# Patient Record
Sex: FEMALE | Race: WHITE | NOT HISPANIC OR LATINO | Employment: UNEMPLOYED | URBAN - METROPOLITAN AREA
[De-identification: names, ages, dates, MRNs, and addresses within clinical notes are randomized per-mention and may not be internally consistent; named-entity substitution may affect disease eponyms.]

---

## 2018-10-10 ENCOUNTER — HOSPITAL ENCOUNTER (EMERGENCY)
Facility: MEDICAL CENTER | Age: 28
End: 2018-10-11
Attending: EMERGENCY MEDICINE
Payer: MEDICAID

## 2018-10-10 ENCOUNTER — APPOINTMENT (OUTPATIENT)
Dept: RADIOLOGY | Facility: MEDICAL CENTER | Age: 28
End: 2018-10-10
Attending: EMERGENCY MEDICINE
Payer: MEDICAID

## 2018-10-10 DIAGNOSIS — M79.10 MYALGIA: ICD-10-CM

## 2018-10-10 DIAGNOSIS — R07.89 CHEST WALL PAIN: ICD-10-CM

## 2018-10-10 DIAGNOSIS — B34.9 VIRAL SYNDROME: ICD-10-CM

## 2018-10-10 LAB
ALBUMIN SERPL BCP-MCNC: 4 G/DL (ref 3.2–4.9)
ALBUMIN/GLOB SERPL: 1.2 G/DL
ALP SERPL-CCNC: 131 U/L (ref 30–99)
ALT SERPL-CCNC: 24 U/L (ref 2–50)
ANION GAP SERPL CALC-SCNC: 8 MMOL/L (ref 0–11.9)
APPEARANCE UR: CLEAR
AST SERPL-CCNC: 15 U/L (ref 12–45)
BACTERIA #/AREA URNS HPF: NEGATIVE /HPF
BASOPHILS # BLD AUTO: 0.3 % (ref 0–1.8)
BASOPHILS # BLD: 0.05 K/UL (ref 0–0.12)
BILIRUB SERPL-MCNC: 0.3 MG/DL (ref 0.1–1.5)
BILIRUB UR QL STRIP.AUTO: NEGATIVE
BNP SERPL-MCNC: 23 PG/ML (ref 0–100)
BUN SERPL-MCNC: 15 MG/DL (ref 8–22)
CALCIUM SERPL-MCNC: 9.3 MG/DL (ref 8.5–10.5)
CHLORIDE SERPL-SCNC: 98 MMOL/L (ref 96–112)
CK SERPL-CCNC: 33 U/L (ref 0–154)
CO2 SERPL-SCNC: 27 MMOL/L (ref 20–33)
COLOR UR: YELLOW
CREAT SERPL-MCNC: 0.77 MG/DL (ref 0.5–1.4)
D DIMER PPP IA.FEU-MCNC: 1.93 UG/ML (FEU) (ref 0–0.5)
EKG IMPRESSION: NORMAL
EOSINOPHIL # BLD AUTO: 0.14 K/UL (ref 0–0.51)
EOSINOPHIL NFR BLD: 0.9 % (ref 0–6.9)
EPI CELLS #/AREA URNS HPF: ABNORMAL /HPF
ERYTHROCYTE [DISTWIDTH] IN BLOOD BY AUTOMATED COUNT: 41 FL (ref 35.9–50)
FLUAV RNA SPEC QL NAA+PROBE: NEGATIVE
FLUBV RNA SPEC QL NAA+PROBE: NEGATIVE
GLOBULIN SER CALC-MCNC: 3.3 G/DL (ref 1.9–3.5)
GLUCOSE SERPL-MCNC: 88 MG/DL (ref 65–99)
GLUCOSE UR STRIP.AUTO-MCNC: NEGATIVE MG/DL
HCG SERPL QL: NEGATIVE
HCT VFR BLD AUTO: 34.3 % (ref 37–47)
HGB BLD-MCNC: 11.8 G/DL (ref 12–16)
HYALINE CASTS #/AREA URNS LPF: ABNORMAL /LPF
IMM GRANULOCYTES # BLD AUTO: 0.13 K/UL (ref 0–0.11)
IMM GRANULOCYTES NFR BLD AUTO: 0.8 % (ref 0–0.9)
KETONES UR STRIP.AUTO-MCNC: NEGATIVE MG/DL
LACTATE BLD-SCNC: 0.9 MMOL/L (ref 0.5–2)
LEUKOCYTE ESTERASE UR QL STRIP.AUTO: ABNORMAL
LYMPHOCYTES # BLD AUTO: 2.05 K/UL (ref 1–4.8)
LYMPHOCYTES NFR BLD: 13.3 % (ref 22–41)
MCH RBC QN AUTO: 30.5 PG (ref 27–33)
MCHC RBC AUTO-ENTMCNC: 34.4 G/DL (ref 33.6–35)
MCV RBC AUTO: 88.6 FL (ref 81.4–97.8)
MICRO URNS: ABNORMAL
MONOCYTES # BLD AUTO: 1.53 K/UL (ref 0–0.85)
MONOCYTES NFR BLD AUTO: 9.9 % (ref 0–13.4)
NEUTROPHILS # BLD AUTO: 11.53 K/UL (ref 2–7.15)
NEUTROPHILS NFR BLD: 74.8 % (ref 44–72)
NITRITE UR QL STRIP.AUTO: NEGATIVE
NRBC # BLD AUTO: 0 K/UL
NRBC BLD-RTO: 0 /100 WBC
PH UR STRIP.AUTO: 6 [PH]
PLATELET # BLD AUTO: 307 K/UL (ref 164–446)
PMV BLD AUTO: 9.2 FL (ref 9–12.9)
POTASSIUM SERPL-SCNC: 3.5 MMOL/L (ref 3.6–5.5)
PROT SERPL-MCNC: 7.3 G/DL (ref 6–8.2)
PROT UR QL STRIP: 30 MG/DL
RBC # BLD AUTO: 3.87 M/UL (ref 4.2–5.4)
RBC # URNS HPF: ABNORMAL /HPF
RBC UR QL AUTO: ABNORMAL
SODIUM SERPL-SCNC: 133 MMOL/L (ref 135–145)
SP GR UR STRIP.AUTO: 1.01
TROPONIN I SERPL-MCNC: <0.01 NG/ML (ref 0–0.04)
UROBILINOGEN UR STRIP.AUTO-MCNC: 1 MG/DL
WBC # BLD AUTO: 15.4 K/UL (ref 4.8–10.8)
WBC #/AREA URNS HPF: ABNORMAL /HPF

## 2018-10-10 PROCEDURE — 84703 CHORIONIC GONADOTROPIN ASSAY: CPT

## 2018-10-10 PROCEDURE — 83880 ASSAY OF NATRIURETIC PEPTIDE: CPT

## 2018-10-10 PROCEDURE — 96374 THER/PROPH/DIAG INJ IV PUSH: CPT

## 2018-10-10 PROCEDURE — 96375 TX/PRO/DX INJ NEW DRUG ADDON: CPT

## 2018-10-10 PROCEDURE — 87040 BLOOD CULTURE FOR BACTERIA: CPT

## 2018-10-10 PROCEDURE — 36415 COLL VENOUS BLD VENIPUNCTURE: CPT

## 2018-10-10 PROCEDURE — 700111 HCHG RX REV CODE 636 W/ 250 OVERRIDE (IP): Performed by: EMERGENCY MEDICINE

## 2018-10-10 PROCEDURE — 84484 ASSAY OF TROPONIN QUANT: CPT

## 2018-10-10 PROCEDURE — 87150 DNA/RNA AMPLIFIED PROBE: CPT

## 2018-10-10 PROCEDURE — 93005 ELECTROCARDIOGRAM TRACING: CPT | Performed by: EMERGENCY MEDICINE

## 2018-10-10 PROCEDURE — 85025 COMPLETE CBC W/AUTO DIFF WBC: CPT

## 2018-10-10 PROCEDURE — 85379 FIBRIN DEGRADATION QUANT: CPT

## 2018-10-10 PROCEDURE — 81001 URINALYSIS AUTO W/SCOPE: CPT

## 2018-10-10 PROCEDURE — 93005 ELECTROCARDIOGRAM TRACING: CPT

## 2018-10-10 PROCEDURE — 99285 EMERGENCY DEPT VISIT HI MDM: CPT

## 2018-10-10 PROCEDURE — 71045 X-RAY EXAM CHEST 1 VIEW: CPT

## 2018-10-10 PROCEDURE — 87502 INFLUENZA DNA AMP PROBE: CPT

## 2018-10-10 PROCEDURE — 83605 ASSAY OF LACTIC ACID: CPT

## 2018-10-10 PROCEDURE — 82550 ASSAY OF CK (CPK): CPT

## 2018-10-10 PROCEDURE — 80053 COMPREHEN METABOLIC PANEL: CPT

## 2018-10-10 PROCEDURE — 700105 HCHG RX REV CODE 258: Performed by: EMERGENCY MEDICINE

## 2018-10-10 RX ORDER — LORAZEPAM 2 MG/ML
1 INJECTION INTRAMUSCULAR ONCE
Status: COMPLETED | OUTPATIENT
Start: 2018-10-10 | End: 2018-10-10

## 2018-10-10 RX ORDER — KETOROLAC TROMETHAMINE 30 MG/ML
30 INJECTION, SOLUTION INTRAMUSCULAR; INTRAVENOUS ONCE
Status: COMPLETED | OUTPATIENT
Start: 2018-10-10 | End: 2018-10-10

## 2018-10-10 RX ORDER — SODIUM CHLORIDE 9 MG/ML
1000 INJECTION, SOLUTION INTRAVENOUS ONCE
Status: COMPLETED | OUTPATIENT
Start: 2018-10-10 | End: 2018-10-10

## 2018-10-10 RX ADMIN — SODIUM CHLORIDE 1000 ML: 9 INJECTION, SOLUTION INTRAVENOUS at 21:59

## 2018-10-10 RX ADMIN — KETOROLAC TROMETHAMINE 30 MG: 30 INJECTION, SOLUTION INTRAMUSCULAR at 21:59

## 2018-10-10 RX ADMIN — LORAZEPAM 1 MG: 2 INJECTION INTRAMUSCULAR; INTRAVENOUS at 22:32

## 2018-10-11 VITALS
HEART RATE: 100 BPM | WEIGHT: 136.91 LBS | HEIGHT: 68 IN | RESPIRATION RATE: 18 BRPM | BODY MASS INDEX: 20.75 KG/M2 | SYSTOLIC BLOOD PRESSURE: 113 MMHG | OXYGEN SATURATION: 99 % | DIASTOLIC BLOOD PRESSURE: 72 MMHG | TEMPERATURE: 98.3 F

## 2018-10-11 PROCEDURE — 87186 SC STD MICRODIL/AGAR DIL: CPT

## 2018-10-11 PROCEDURE — 71275 CT ANGIOGRAPHY CHEST: CPT

## 2018-10-11 PROCEDURE — 87086 URINE CULTURE/COLONY COUNT: CPT

## 2018-10-11 PROCEDURE — 700117 HCHG RX CONTRAST REV CODE 255: Performed by: EMERGENCY MEDICINE

## 2018-10-11 PROCEDURE — 87077 CULTURE AEROBIC IDENTIFY: CPT

## 2018-10-11 RX ADMIN — IOHEXOL 50 ML: 350 INJECTION, SOLUTION INTRAVENOUS at 00:05

## 2018-10-11 NOTE — ED TRIAGE NOTES
Chief Complaint   Patient presents with   • Chest Pain     increases with deep inspiration   • Shortness of Breath     with exertion   • Generalized Body Aches     Symptoms X 3 days.  Triage process explained to patient.  Pt back to waiting room.  Pt instructed to inform RN if any changes or questions arise.

## 2018-10-11 NOTE — ED PROVIDER NOTES
"ED Provider Note    Scribed for Benny Marc M.D. by Marlene Michael. 10/10/2018  9:38 PM    Means of arrival: walk in   History obtained from: patient   History limited by: none     CHIEF COMPLAINT  Chief Complaint   Patient presents with   • Chest Pain     increases with deep inspiration   • Shortness of Breath     with exertion   • Generalized Body Aches       HPI  Alicia Peters is a 27 y.o. female who presents to the Emergency Department for evaluation of worsening chest pain which began 3 days ago. Patient reports associated shortness of breath, chills, \"hot flashes\", fatigue, rhinorrhea, intermittent cough, nausea, abdominal pain, leg pain, frequent urination, and generalized body aches. Patient reports her chest pain is exacerbated with deep inspiration and her shortness of breath is exacerbated with exertion. She has taken Ibuprofen and Tylenol for pain relief. Patient states she currently has her menstrual period. She has a history of UTI's. No history of DVT or PE. Patient does smoke 0.5 packs of cigarettes daily and endorses to smoking marijuana daily. She is not currently taking hormones. No complaints of fevers and dysuria.     REVIEW OF SYSTEMS  Pertinent positives include chest pain, shortness of breath, chills, hot flashes, fatigue, rhinorrhea, intermittent cough, nausea yesterday, abdominal pain, leg pain, frequent urination, and generalized body aches. Pertinent negatives include no fevers and dysuria.  All other systems reviewed and negative.    PAST MEDICAL HISTORY   No pertinent history.     SURGICAL HISTORY   has a past surgical history that includes gyn surgery.    SOCIAL HISTORY  Social History   Substance Use Topics   • Smoking status: Current Every Day Smoker     Packs/day: 0.50     Types: Cigarettes   • Smokeless tobacco: Never Used   • Alcohol use No      History   Drug Use     Comment: marijuana, narcotic pain medications       FAMILY HISTORY  History reviewed. No pertinent family " "history.    CURRENT MEDICATIONS  Home Medications     Reviewed by Jony Wilcox R.N. (Registered Nurse) on 10/10/18 at 2138  Med List Status: Complete   Medication Last Dose Status        Patient Kishan Taking any Medications                       ALLERGIES  No Known Allergies    PHYSICAL EXAM  VITAL SIGNS: /72   Pulse (!) 130   Temp 36.8 °C (98.3 °F) (Temporal)   Resp 17   Ht 1.727 m (5' 8\")   Wt 62.1 kg (136 lb 14.5 oz)   SpO2 94%   BMI 20.82 kg/m²     Constitutional: Well developed, Well nourished, mild to moderate distress, Non-toxic appearance.   HENT: Normocephalic, Atraumatic, Bilateral external ears normal, Dry mucous membranes, No oral exudates.   Eyes: PERRLA, EOMI, Conjunctiva normal, No discharge.   Neck: No tenderness, Supple, No stridor.   Lymphatic: No lymphadenopathy noted.   Cardiovascular: Normal heart rate, Normal rhythm.   Thorax & Lungs: Clear to auscultation bilaterally, No respiratory distress, No wheezing, No crackles. Chest wall nontender.   Abdomen: Soft, No tenderness, No masses, No pulsatile masses.   Skin: Warm, Dry, No erythema, No rash.   Extremities:, No edema, No cyanosis.   Musculoskeletal: No tenderness to palpation or major deformities noted.  Intact distal pulses  Neurologic: Awake, alert. Moves all extremities spontaneously.  Psychiatric: Anxious.    LABS  Results for orders placed or performed during the hospital encounter of 10/10/18   CBC w/ Differential   Result Value Ref Range    WBC 15.4 (H) 4.8 - 10.8 K/uL    RBC 3.87 (L) 4.20 - 5.40 M/uL    Hemoglobin 11.8 (L) 12.0 - 16.0 g/dL    Hematocrit 34.3 (L) 37.0 - 47.0 %    MCV 88.6 81.4 - 97.8 fL    MCH 30.5 27.0 - 33.0 pg    MCHC 34.4 33.6 - 35.0 g/dL    RDW 41.0 35.9 - 50.0 fL    Platelet Count 307 164 - 446 K/uL    MPV 9.2 9.0 - 12.9 fL    Neutrophils-Polys 74.80 (H) 44.00 - 72.00 %    Lymphocytes 13.30 (L) 22.00 - 41.00 %    Monocytes 9.90 0.00 - 13.40 %    Eosinophils 0.90 0.00 - 6.90 %    Basophils 0.30 0.00 - " 1.80 %    Immature Granulocytes 0.80 0.00 - 0.90 %    Nucleated RBC 0.00 /100 WBC    Neutrophils (Absolute) 11.53 (H) 2.00 - 7.15 K/uL    Lymphs (Absolute) 2.05 1.00 - 4.80 K/uL    Monos (Absolute) 1.53 (H) 0.00 - 0.85 K/uL    Eos (Absolute) 0.14 0.00 - 0.51 K/uL    Baso (Absolute) 0.05 0.00 - 0.12 K/uL    Immature Granulocytes (abs) 0.13 (H) 0.00 - 0.11 K/uL    NRBC (Absolute) 0.00 K/uL   Complete Metabolic Panel (CMP)   Result Value Ref Range    Sodium 133 (L) 135 - 145 mmol/L    Potassium 3.5 (L) 3.6 - 5.5 mmol/L    Chloride 98 96 - 112 mmol/L    Co2 27 20 - 33 mmol/L    Anion Gap 8.0 0.0 - 11.9    Glucose 88 65 - 99 mg/dL    Bun 15 8 - 22 mg/dL    Creatinine 0.77 0.50 - 1.40 mg/dL    Calcium 9.3 8.5 - 10.5 mg/dL    AST(SGOT) 15 12 - 45 U/L    ALT(SGPT) 24 2 - 50 U/L    Alkaline Phosphatase 131 (H) 30 - 99 U/L    Total Bilirubin 0.3 0.1 - 1.5 mg/dL    Albumin 4.0 3.2 - 4.9 g/dL    Total Protein 7.3 6.0 - 8.2 g/dL    Globulin 3.3 1.9 - 3.5 g/dL    A-G Ratio 1.2 g/dL   Troponin STAT   Result Value Ref Range    Troponin I <0.01 0.00 - 0.04 ng/mL   Btype Natriuretic Peptide   Result Value Ref Range    B Natriuretic Peptide 23 0 - 100 pg/mL   Influenza By PCR, A/B   Result Value Ref Range    Influenza virus A RNA Negative Negative    Influenza virus B, PCR Negative Negative   HCG Qual Serum   Result Value Ref Range    Beta-Hcg Qualitative Serum Negative Negative   LACTIC ACID   Result Value Ref Range    Lactic Acid 0.9 0.5 - 2.0 mmol/L   URINALYSIS,CULTURE IF INDICATED   Result Value Ref Range    Color Yellow     Character Clear     Specific Gravity 1.015 <1.035    Ph 6.0 5.0 - 8.0    Glucose Negative Negative mg/dL    Ketones Negative Negative mg/dL    Protein 30 (A) Negative mg/dL    Bilirubin Negative Negative    Urobilinogen, Urine 1.0 Negative    Nitrite Negative Negative    Leukocyte Esterase Small (A) Negative    Occult Blood Small (A) Negative    Micro Urine Req Microscopic    CREATINE KINASE   Result Value  Ref Range    CPK Total 33 0 - 154 U/L   D-Dimer (only helpful in low pre-test probability wells critieria. Do not order if patient ruled out by PERC criteria. See Weblinks at top of Labs section)   Result Value Ref Range    D-Dimer Screen 1.93 (H) 0.00 - 0.50 ug/mL (FEU)   ESTIMATED GFR   Result Value Ref Range    GFR If African American >60 >60 mL/min/1.73 m 2    GFR If Non African American >60 >60 mL/min/1.73 m 2   URINE MICROSCOPIC (W/UA)   Result Value Ref Range    WBC 10-20 (A) /hpf    RBC 5-10 (A) /hpf    Bacteria Negative None /hpf    Epithelial Cells Few /hpf    Hyaline Cast 3-5 (A) /lpf   EKG   Result Value Ref Range    Report       [facility] Emergency Dept.    Test Date:  2018-10-10  Pt Name:    EDUARDO RAYMUNDO                Department: ER  MRN:        [id number]                      Room:  Gender:     Female                       Technician: [id number]  :        [id number]                   Requested By:ER TRIAGE PROTOCOL  Order #:    [id number]                    Reading MD: LAUREL BARKSDALE MD    Measurements  Intervals                                Axis  Rate:       122                          P:          70  NV:         136                          QRS:        66  QRSD:       92                           T:          33  QT:         296  QTc:        422    Interpretive Statements  SINUS TACHYCARDIA  PROBABLE LEFT ATRIAL ABNORMALITY  No previous ECG available for comparison    Electronically Signed On 10- 21:38:47 PDT by LAUREL BARKSDALE MD     All labs reviewed by me.    EKG  Interpreted by myself, as shown above.     RADIOLOGY  DX-CHEST-PORTABLE (1 VIEW)   Final Result         1. No acute cardiopulmonary abnormalities are identified.      CT-CTA CHEST PULMONARY ARTERY W/ RECONS    (Results Pending)     The radiologist's interpretation of all radiological studies have been reviewed by me.    COURSE & MEDICAL DECISION MAKING  Pertinent Labs & Imaging studies reviewed. (See  chart for details)    9:38 PM - Patient seen and examined at bedside. Patient will be treated with Toradol 30 mg, Ativan 1 mg. The patient will be resuscitated with 1L NS IV for her dry mucous membranes. Ordered DX chest, lactic acid, D-dimer, creatine kinase, blood culture, urinalysis culture, influenza by PCR, CBC, CMP, troponin, BNP to evaluate her symptoms. The differential diagnoses include but are not limited to: PE, viral illness, chest wall pain, pleurisy, influenza.     10:45 PM- Reviewed the patient's lab and imaging results.         Decision Making:  Patient with tachycardia, viral type symptoms, runny nose cough congestion diffuse muscle aches.  The patient is also extremely anxious.  We give the patient IV fluids due to the patient's tachycardia, give the patient some Ativan with improvement of the patient's symptoms.  Laboratory tests including influenza are nonspecific and unremarkable.  The patient does have a mild leukocytosis this is likely a stress response.  The patient's d-dimer was elevated therefore will get a CT PE study, this is currently pending.  If this is unremarkable I discussed with her I believe she is likely having a viral illness to take Tylenol and ibuprofen for her symptoms, the patient will return with worsening symptoms.  If the PE study is positive the patient will be further evaluated by my partner.    HYDRATION: Based on the patient's presentation of Dehydration the patient was given IV fluids. IV Hydration was used becasue oral hydration was not adequate alone. Upon recheck following hydration, the patient was improved.     The patient will return for new or worsening symptoms and is stable at the time of discharge.    The patient is referred to a primary physician for blood pressure management, diabetic screening, and for all other preventative health concerns.        DISPOSITION:  Patient will be discharged home in stable condition.    FOLLOW UP:  No follow-up provider  specified.    OUTPATIENT MEDICATIONS:  New Prescriptions    No medications on file     FINAL IMPRESSION  1. Viral syndrome    2. Myalgia    3. Chest wall pain          IMarlene (Scribe), am scribing for, and in the presence of, Benny Marc M.D..    Electronically signed by: Marlene Michael (Scribe), 10/10/2018    Benny MEJIA M.D. personally performed the services described in this documentation, as scribed by Marlene Michael in my presence, and it is both accurate and complete. C.     The note accurately reflects work and decisions made by me.  Benny Marc  10/10/2018  11:57 PM

## 2018-10-11 NOTE — ED NOTES
Flu swab obtained and sent to lab, lactic acid and one set of cultures sent as well. Lab called for second set of cultures. Pt aware of need for UA. Pt medicated per MAR.

## 2018-10-11 NOTE — ED NOTES
Pt to be discharged at this time, PIV removed and site bandaged, discharge instructions and AVS provided, all questions answered.

## 2018-10-13 LAB
BACTERIA UR CULT: ABNORMAL
BACTERIA UR CULT: ABNORMAL
SIGNIFICANT IND 70042: ABNORMAL
SITE SITE: ABNORMAL
SOURCE SOURCE: ABNORMAL

## 2018-10-14 NOTE — ED NOTES
"ED Positive Culture Follow-up/Notification Note:    Date: 10/14/18     Patient seen in the ED on 10/10/2018 for worsening chest pain 3 days prior to presentation, as well as, shortness of breath, chills, \"hot flashes\", fatigue, rhinorrhea, intermittent cough, nausea, abdominal pain, leg pain, frequent urination, and generalized body aches. No complaints of fevers and dysuria. She endorses cigarettes and smoking marijuana daily.  1. Viral syndrome    2. Myalgia    3. Chest wall pain       There are no discharge medications for this patient.      Allergies: Patient has no known allergies.     Vitals:    10/10/18 2325 10/10/18 2330 10/11/18 0012 10/11/18 0100   BP:       Pulse: 93 98 96 100   Resp: 13 12 15 18   Temp:       TempSrc:       SpO2: 96% 98% 100% 99%   Weight:       Height:           Final cultures:   Results     Procedure Component Value Units Date/Time    URINE CULTURE(NEW) [[id number]]  (Abnormal)  (Susceptibility) Collected:  10/11/18 0004    Order Status:  Completed Specimen:  Urine Updated:  10/13/18 0937     Significant Indicator POS (POS)     Source UR     Site --     Urine Culture -- (A)      Escherichia coli  10-50,000 cfu/mL   (A)    Culture & Susceptibility     ESCHERICHIA COLI     Antibiotic Sensitivity Microscan Unit Status    Ampicillin Resistant >16 mcg/mL Final    Method: SENSITIVITY, ENEIDA    Cefepime Sensitive <=8 mcg/mL Final    Method: SENSITIVITY, ENEIDA    Cefotaxime Sensitive <=2 mcg/mL Final    Method: SENSITIVITY, ENEIDA    Cefotetan Sensitive <=16 mcg/mL Final    Method: SENSITIVITY, ENEIDA    Ceftazidime Sensitive <=1 mcg/mL Final    Method: SENSITIVITY, ENEIDA    Ceftriaxone Sensitive <=8 mcg/mL Final    Method: SENSITIVITY, ENEIDA    Cefuroxime Sensitive <=4 mcg/mL Final    Method: SENSITIVITY, ENEIDA    Cephalothin Sensitive <=8 mcg/mL Final    Method: SENSITIVITY, ENEIDA    Ciprofloxacin Sensitive <=1 mcg/mL Final    Method: SENSITIVITY, ENEIDA    Gentamicin Sensitive <=4 mcg/mL Final    Method: " "SENSITIVITY, ENEIDA    Levofloxacin Sensitive <=2 mcg/mL Final    Method: SENSITIVITY, ENEIDA    Nitrofurantoin Sensitive <=32 mcg/mL Final    Method: SENSITIVITY, ENEIDA    Pip/Tazobactam Sensitive <=16 mcg/mL Final    Method: SENSITIVITY, ENEIDA    Piperacillin Resistant >64 mcg/mL Final    Method: SENSITIVITY, ENEIDA    Tigecycline Sensitive <=2 mcg/mL Final    Method: SENSITIVITY, ENEIDA    Tobramycin Sensitive <=4 mcg/mL Final    Method: SENSITIVITY, ENEIDA    Trimeth/Sulfa Sensitive <=2/38 mcg/mL Final    Method: SENSITIVITY, ENEIDA                       BLOOD CULTURE [[id number]] Collected:  10/10/18 2200    Order Status:  Completed Specimen:  Blood from Peripheral Updated:  10/12/18 0907     Significant Indicator NEG     Source BLD     Site PERIPHERAL     Blood Culture No Growth    Note: Blood cultures are incubated for 5 days and  are monitored continuously.Positive blood cultures  are called to the RN and reported as soon as  they are identified.      Narrative:       1 of 2 for Blood Culture x 2 sites order. Per Hospital  Policy: Only change Specimen Src: to \"Line\" if specified by  physician order.    BLOOD CULTURE [[id number]] Collected:  10/10/18 2216    Order Status:  Completed Specimen:  Blood from Peripheral Updated:  10/12/18 0907     Significant Indicator NEG     Source BLD     Site PERIPHERAL     Blood Culture No Growth    Note: Blood cultures are incubated for 5 days and  are monitored continuously.Positive blood cultures  are called to the RN and reported as soon as  they are identified.      Narrative:       2 of 2 blood culture x2  Sites order. Per Hospital Policy:  Only change Specimen Src: to \"Line\" if specified by physician  order.    URINALYSIS,CULTURE IF INDICATED [[id number]]  (Abnormal) Collected:  10/10/18 2225    Order Status:  Completed Specimen:  Urine Updated:  10/10/18 2249     Color Yellow     Character Clear     Specific Gravity 1.015     Ph 6.0     Glucose Negative mg/dL      Ketones Negative mg/dL      " Protein 30 (A) mg/dL      Bilirubin Negative     Urobilinogen, Urine 1.0     Nitrite Negative     Leukocyte Esterase Small (A)     Occult Blood Small (A)     Micro Urine Req Microscopic    Influenza By PCR, A/B [[id number]] Collected:  10/10/18 2149    Order Status:  Completed Specimen:  Blood from Nasopharyngeal Updated:  10/10/18 2245     Influenza virus A RNA Negative     Influenza virus B, PCR Negative    Influenza By PCR, A/B [[id number]]     Order Status:  Canceled Specimen:  Nasopharyngeal           Plan:   This result likely represents colonization and may represent asymptomatic bacteriuria. It is not recommended per [employer] [place] to treat asymptomatic bacteriuria. No changes required based upon culture result.    Whitney Luna, PharmD

## 2018-10-15 NOTE — ED NOTES
"ED Positive Culture Follow-up/Notification Note:    Date: 10/15/18     Patient seen in the ED on 10/10/2018 for the below.   1. Viral syndrome    2. Myalgia    3. Chest wall pain       There are no discharge medications for this patient.      Allergies: Patient has no known allergies.     Vitals:    10/10/18 2325 10/10/18 2330 10/11/18 0012 10/11/18 0100   BP:       Pulse: 93 98 96 100   Resp: 13 12 15 18   Temp:       TempSrc:       SpO2: 96% 98% 100% 99%   Weight:       Height:           Final cultures:   Results     Procedure Component Value Units Date/Time    BLOOD CULTURE [[id number]]  (Abnormal) Collected:  10/10/18 2200    Order Status:  Completed Specimen:  Blood from Peripheral Updated:  10/14/18 1951     Significant Indicator POS (POS)     Source BLD     Site PERIPHERAL     Blood Culture Growth detected by Bactec instrument. 10/14/2018  19:51  Gram Stain: Gram positive cocci: Possible Staphylococcus sp.  Negative for Staphylococcus aureus and MRSA by PCR. Correlate  ongoing need for antibiotics with clinical condition.  Further report to follow.   (A)    Narrative:       1 of 2 for Blood Culture x 2 sites order. Per Hospital  Policy: Only change Specimen Src: to \"Line\" if specified by  physician order.    URINE CULTURE(NEW) [[id number]]  (Abnormal)  (Susceptibility) Collected:  10/11/18 0004    Order Status:  Completed Specimen:  Urine Updated:  10/13/18 0937     Significant Indicator POS (POS)     Source UR     Site --     Urine Culture -- (A)      Escherichia coli  10-50,000 cfu/mL   (A)    Culture & Susceptibility     ESCHERICHIA COLI     Antibiotic Sensitivity Microscan Unit Status    Ampicillin Resistant >16 mcg/mL Final    Method: SENSITIVITY, ENEIDA    Cefepime Sensitive <=8 mcg/mL Final    Method: SENSITIVITY, ENEIDA    Cefotaxime Sensitive <=2 mcg/mL Final    Method: SENSITIVITY, ENEIDA    Cefotetan Sensitive <=16 mcg/mL Final    Method: SENSITIVITY, ENEIDA    Ceftazidime Sensitive <=1 mcg/mL Final    Method: " "SENSITIVITY, ENEIDA    Ceftriaxone Sensitive <=8 mcg/mL Final    Method: SENSITIVITY, ENEIDA    Cefuroxime Sensitive <=4 mcg/mL Final    Method: SENSITIVITY, ENEIDA    Cephalothin Sensitive <=8 mcg/mL Final    Method: SENSITIVITY, ENEIDA    Ciprofloxacin Sensitive <=1 mcg/mL Final    Method: SENSITIVITY, ENEIDA    Gentamicin Sensitive <=4 mcg/mL Final    Method: SENSITIVITY, ENEIDA    Levofloxacin Sensitive <=2 mcg/mL Final    Method: SENSITIVITY, ENEIDA    Nitrofurantoin Sensitive <=32 mcg/mL Final    Method: SENSITIVITY, ENEIDA    Pip/Tazobactam Sensitive <=16 mcg/mL Final    Method: SENSITIVITY, ENEIDA    Piperacillin Resistant >64 mcg/mL Final    Method: SENSITIVITY, ENEIDA    Tigecycline Sensitive <=2 mcg/mL Final    Method: SENSITIVITY, ENEIDA    Tobramycin Sensitive <=4 mcg/mL Final    Method: SENSITIVITY, ENEIDA    Trimeth/Sulfa Sensitive <=2/38 mcg/mL Final    Method: SENSITIVITY, ENEIDA                       BLOOD CULTURE [[id number]] Collected:  10/10/18 2216    Order Status:  Completed Specimen:  Blood from Peripheral Updated:  10/12/18 0907     Significant Indicator NEG     Source BLD     Site PERIPHERAL     Blood Culture No Growth    Note: Blood cultures are incubated for 5 days and  are monitored continuously.Positive blood cultures  are called to the RN and reported as soon as  they are identified.      Narrative:       2 of 2 blood culture x2  Sites order. Per Hospital Policy:  Only change Specimen Src: to \"Line\" if specified by physician  order.    URINALYSIS,CULTURE IF INDICATED [[id number]]  (Abnormal) Collected:  10/10/18 2225    Order Status:  Completed Specimen:  Urine Updated:  10/10/18 2249     Color Yellow     Character Clear     Specific Gravity 1.015     Ph 6.0     Glucose Negative mg/dL      Ketones Negative mg/dL      Protein 30 (A) mg/dL      Bilirubin Negative     Urobilinogen, Urine 1.0     Nitrite Negative     Leukocyte Esterase Small (A)     Occult Blood Small (A)     Micro Urine Req Microscopic    Influenza By PCR, A/B " [[id number]] Collected:  10/10/18 2149    Order Status:  Completed Specimen:  Blood from Nasopharyngeal Updated:  10/10/18 2245     Influenza virus A RNA Negative     Influenza virus B, PCR Negative    Influenza By PCR, A/B [[id number]]     Order Status:  Canceled Specimen:  Nasopharyngeal           Plan:   Blood culture turned positive for CoNS   -Contacted pt who states all of her symptoms have resolved. In light of the fact that pt is not toxic and all symptoms have resolved, suspect contamination of the sample.   -No further follow up indicated with respect to culture results    Selam Bermudez

## 2018-10-16 LAB
BACTERIA BLD CULT: NORMAL
SIGNIFICANT IND 70042: NORMAL
SITE SITE: NORMAL
SOURCE SOURCE: NORMAL

## 2018-10-17 LAB
BACTERIA BLD CULT: ABNORMAL
BACTERIA BLD CULT: ABNORMAL
SIGNIFICANT IND 70042: ABNORMAL
SITE SITE: ABNORMAL
SOURCE SOURCE: ABNORMAL

## 2019-11-14 ENCOUNTER — HOSPITAL ENCOUNTER (OUTPATIENT)
Facility: MEDICAL CENTER | Age: 29
End: 2019-11-14
Attending: PHYSICIAN ASSISTANT
Payer: MEDICAID

## 2019-11-14 ENCOUNTER — OFFICE VISIT (OUTPATIENT)
Dept: URGENT CARE | Facility: CLINIC | Age: 29
End: 2019-11-14

## 2019-11-14 VITALS
DIASTOLIC BLOOD PRESSURE: 60 MMHG | HEART RATE: 88 BPM | OXYGEN SATURATION: 99 % | BODY MASS INDEX: 21.22 KG/M2 | RESPIRATION RATE: 16 BRPM | TEMPERATURE: 97.7 F | SYSTOLIC BLOOD PRESSURE: 100 MMHG | WEIGHT: 140 LBS | HEIGHT: 68 IN

## 2019-11-14 DIAGNOSIS — N76.0 ACUTE VAGINITIS: ICD-10-CM

## 2019-11-14 LAB
APPEARANCE UR: NORMAL
BILIRUB UR STRIP-MCNC: NORMAL MG/DL
COLOR UR AUTO: YELLOW
FORWARD REASON: SPWHY: NORMAL
FORWARDED TO LAB: SPWHR: NORMAL
GLUCOSE UR STRIP.AUTO-MCNC: NORMAL MG/DL
INT CON NEG: NORMAL
INT CON POS: NORMAL
KETONES UR STRIP.AUTO-MCNC: NORMAL MG/DL
LEUKOCYTE ESTERASE UR QL STRIP.AUTO: NORMAL
NITRITE UR QL STRIP.AUTO: NORMAL
PH UR STRIP.AUTO: 7 [PH] (ref 5–8)
POC URINE PREGNANCY TEST: NORMAL
PROT UR QL STRIP: NORMAL MG/DL
RBC UR QL AUTO: NORMAL
SP GR UR STRIP.AUTO: 1.02
SPECIMEN SENT (2ND): SPWT2: NORMAL
SPECIMEN SENT (3RD): SPWT3: NORMAL
SPECIMEN SENT (4TH): SPWT4: NORMAL
SPECIMEN SENT: SPWT1: NORMAL
UROBILINOGEN UR STRIP-MCNC: 0.2 MG/DL

## 2019-11-14 PROCEDURE — 99203 OFFICE O/P NEW LOW 30 MIN: CPT | Performed by: PHYSICIAN ASSISTANT

## 2019-11-14 PROCEDURE — 81025 URINE PREGNANCY TEST: CPT | Performed by: PHYSICIAN ASSISTANT

## 2019-11-14 PROCEDURE — 81002 URINALYSIS NONAUTO W/O SCOPE: CPT | Performed by: PHYSICIAN ASSISTANT

## 2019-11-14 ASSESSMENT — ENCOUNTER SYMPTOMS
FLANK PAIN: 0
FEVER: 0
CHILLS: 0
NAUSEA: 0
SHORTNESS OF BREATH: 0
DIARRHEA: 0
VOMITING: 0
ABDOMINAL PAIN: 0
BLURRED VISION: 0
DIZZINESS: 0
PALPITATIONS: 0

## 2019-11-15 NOTE — PROGRESS NOTES
Subjective:      Alicia Peters is a 28 y.o. female who presents with Vaginitis (odor, discharge   5 or 6 months)      HPI:  This is a new problem. Alicia Peters is a 28 y.o. female who presents today for evaluation of abnormal vaginal discharge.  Patient states she has been experiencing this for approximately 5 or 6 months.  She states she is had thick, white discharge has a foul odor.  She is approximately 2 months ago she went to [facility] and states that they treated her for chlamydia.  They are not sure all of what was tested, however.  She said that she took 1 pill for chlamydia treatment but her symptoms never went away.  They have persisted for this long.  She says she is only had one sexual partner, her boyfriend, and he has been in group home for the past 6 months or so.  She also states that he was tested in group home and all of his results came back negative.        Review of Systems   Constitutional: Negative for chills, fever and malaise/fatigue.   Eyes: Negative for blurred vision.   Respiratory: Negative for shortness of breath.    Cardiovascular: Negative for chest pain and palpitations.   Gastrointestinal: Negative for abdominal pain, diarrhea, nausea and vomiting.   Genitourinary: Negative for dysuria, flank pain, frequency and urgency.        Thick white vaginal discharge, malodorous discharge   Neurological: Negative for dizziness.       PMH:  has no past medical history on file.  MEDS: No current outpatient medications on file.  ALLERGIES: No Known Allergies  SURGHX:   Past Surgical History:   Procedure Laterality Date   • GYN SURGERY      ectopic pregnancy     SOCHX:  reports that she has been smoking cigarettes. She has been smoking about 0.50 packs per day. She has never used smokeless tobacco. She reports current drug use. Drugs: Marijuana and Inhaled. She reports that she does not drink alcohol.  FH: Family history was reviewed, no pertinent findings to report     Objective:     /60  "  Pulse 88   Temp 36.5 °C (97.7 °F) (Oral)   Resp 16   Ht 1.727 m (5' 8\")   Wt 63.5 kg (140 lb)   SpO2 99%   BMI 21.29 kg/m²      Physical Exam  Constitutional:       Appearance: Normal appearance. She is well-developed.   HENT:      Head: Normocephalic and atraumatic.      Right Ear: External ear normal.      Left Ear: External ear normal.   Eyes:      Conjunctiva/sclera: Conjunctivae normal.      Pupils: Pupils are equal, round, and reactive to light.   Cardiovascular:      Rate and Rhythm: Normal rate and regular rhythm.      Heart sounds: No murmur.   Pulmonary:      Effort: Pulmonary effort is normal.      Breath sounds: Normal breath sounds.   Abdominal:      Palpations: Abdomen is soft.      Tenderness: There is no tenderness.   Genitourinary:     Comments: * exam deferred.  Patient did self swab for vaginal pathogens DNA panel.  Skin:     General: Skin is warm and dry.      Capillary Refill: Capillary refill takes less than 2 seconds.   Neurological:      Mental Status: She is alert and oriented to person, place, and time.   Psychiatric:         Behavior: Behavior normal.         Judgment: Judgment normal.         POCT Pregnancy - Negative      POCT Urinalysis - Negative    Assessment/Plan:       1. Acute vaginitis  - POCT Pregnancy  - POCT Urinalysis  - VAGINAL PATHOGENS DNA PANEL; Future  - CHLAMYDIA/GC PCR URINE OR SWAB; Future            Differential Diagnosis, natural history, and supportive care discussed. Return to the Urgent Care or follow up with your PCP if symptoms fail to resolve, or for any new or worsening symptoms. Emergency room precautions discussed. Patient and/or family appears understanding of information.  "

## 2019-11-19 DIAGNOSIS — N76.0 BV (BACTERIAL VAGINOSIS): ICD-10-CM

## 2019-11-19 DIAGNOSIS — B96.89 BV (BACTERIAL VAGINOSIS): ICD-10-CM

## 2019-11-19 RX ORDER — METRONIDAZOLE 500 MG/1
500 TABLET ORAL 2 TIMES DAILY
Qty: 14 TAB | Refills: 0 | Status: SHIPPED | OUTPATIENT
Start: 2019-11-19 | End: 2019-11-26

## 2019-11-20 NOTE — PROGRESS NOTES
Spoke with patient regarding results of positive bacterial vaginosis.  We will start the patient on metronidazole twice daily for 7 days.  Advised no alcohol while on this medication.  Did discuss that the gonorrhea chlamydia testing was not performed due to an error in the collection sample but patient has already been treated for gonorrhea/chlamydia anyways and has not had any sexual intercourse since that time.  Advised that after treatment for BV if she is still having symptoms she will need to be retested for gonorrhea and chlamydia.  Patient understands and agrees the plan.

## 2021-01-07 ENCOUNTER — GYNECOLOGY VISIT (OUTPATIENT)
Dept: OBGYN | Facility: CLINIC | Age: 31
End: 2021-01-07
Payer: MEDICAID

## 2021-01-07 VITALS
DIASTOLIC BLOOD PRESSURE: 54 MMHG | BODY MASS INDEX: 21.37 KG/M2 | WEIGHT: 141 LBS | HEIGHT: 68 IN | SYSTOLIC BLOOD PRESSURE: 108 MMHG

## 2021-01-07 DIAGNOSIS — R10.31 RIGHT LOWER QUADRANT PAIN: ICD-10-CM

## 2021-01-07 DIAGNOSIS — R10.31 ABDOMINAL PAIN, BILATERAL LOWER QUADRANT: ICD-10-CM

## 2021-01-07 DIAGNOSIS — Z01.419 WELL WOMAN EXAM WITH ROUTINE GYNECOLOGICAL EXAM: Primary | ICD-10-CM

## 2021-01-07 DIAGNOSIS — R10.32 ABDOMINAL PAIN, BILATERAL LOWER QUADRANT: ICD-10-CM

## 2021-01-07 PROCEDURE — 99385 PREV VISIT NEW AGE 18-39: CPT | Mod: EP | Performed by: NURSE PRACTITIONER

## 2021-01-07 ASSESSMENT — ENCOUNTER SYMPTOMS
CONSTITUTIONAL NEGATIVE: 1
RESPIRATORY NEGATIVE: 1
PSYCHIATRIC NEGATIVE: 1
CARDIOVASCULAR NEGATIVE: 1
GASTROINTESTINAL NEGATIVE: 1
NEUROLOGICAL NEGATIVE: 1
MUSCULOSKELETAL NEGATIVE: 1
EYES NEGATIVE: 1

## 2021-01-07 NOTE — PROGRESS NOTES
GYN vit: NEw Pt  LMP: 12/17/2020  WT: 141 lb  BP:  108/54  BC; Condoms   Pt states having pain on her right lower abdomen after intercourse that comes and goes for 9-10 months. Pt also reports having a lot of white creamy vaginal discharge that comes and goes with odor sometimes x 1 yr. States this happens usually after her period.   Rinku # [phone]

## 2021-01-07 NOTE — PROGRESS NOTES
"Alicia Peters is a 30 y.o. y.o. female who presents for her Gynecologic Exam        HPI Comments: Pt presents for well woman exam. Pt has of increased dischaarge continuously through her cycle as well as right sided lower abdominal pain that is sharp,  right after intercourse, which is more severe , then randomly throughout the last 9 months which is less severe.    Patient's last menstrual period was 12/17/2020 (exact date). First menses age 15.  28 day cycles. Lasts 5-7 days, flow heavy 1 day, rest are light.   No sexual activity for last 5 months per pt report but does use condoms when sexually active.     Review of Systems   Pertinent positives documented in HPI and all other systems reviewed & are negative  Review of Systems   Constitutional: Negative.    HENT: Negative.    Eyes: Negative.    Respiratory: Negative.    Cardiovascular: Negative.    Gastrointestinal: Negative.    Genitourinary: Positive for urgency. Negative for frequency.        Irregular urgency, sometimes \"feels like has to go really bad, but then just a little comes out\", does not happen often but \"notices when it does\", denies dysuria or frequency.    Musculoskeletal: Negative.    Skin: Negative.    Neurological: Negative.    Endo/Heme/Allergies: Negative.    Psychiatric/Behavioral: Negative.    All other systems reviewed and are negative.      All PMH, PSH, allergies, social history and FH reviewed and updated today:  Past Medical History:   Diagnosis Date   • Substance abuse (HCC)     last used opioids 6 yrs ago.     Past Surgical History:   Procedure Laterality Date   • GYN SURGERY  2017    ectopic pregnancy (left tube)     Patient has no known allergies.  Social History     Socioeconomic History   • Marital status: Single     Spouse name: Not on file   • Number of children: Not on file   • Years of education: Not on file   • Highest education level: Not on file   Occupational History   • Not on file   Social Needs   • Financial resource " "strain: Not on file   • Food insecurity     Worry: Not on file     Inability: Not on file   • Transportation needs     Medical: Not on file     Non-medical: Not on file   Tobacco Use   • Smoking status: Current Every Day Smoker     Packs/day: 0.50     Types: Cigarettes   • Smokeless tobacco: Never Used   Substance and Sexual Activity   • Alcohol use: No   • Drug use: Yes     Types: Marijuana, Inhaled     Comment: marijuana uses once a month. narcotic pain medications lass used 6 yrs ago   • Sexual activity: Not Currently     Partners: Male     Birth control/protection: Condom Male   Lifestyle   • Physical activity     Days per week: Not on file     Minutes per session: Not on file   • Stress: Not on file   Relationships   • Social connections     Talks on phone: Not on file     Gets together: Not on file     Attends Restorationism service: Not on file     Active member of club or organization: Not on file     Attends meetings of clubs or organizations: Not on file     Relationship status: Not on file   • Intimate partner violence     Fear of current or ex partner: Not on file     Emotionally abused: Not on file     Physically abused: Not on file     Forced sexual activity: Not on file   Other Topics Concern   • Not on file   Social History Narrative   • Not on file     Family History   Problem Relation Age of Onset   • No Known Problems Mother    • No Known Problems Father    • No Known Problems Sister    • No Known Problems Brother      Medications:   No current [facility]-ordered outpatient medications on file.     No current [facility]-ordered facility-administered medications on file.           Objective:   Vital measurements:  /54 (BP Location: Left arm, Patient Position: Sitting)   Ht 1.727 m (5' 8\")   Wt 64 kg (141 lb)   Body mass index is 21.44 kg/m². (Goal BM I>18 <25)    Physical Exam   Nursing note and vitals reviewed.  Constitutional: She is oriented to person, place, and time. She appears well-developed and " well-nourished. No distress.     HEENT:   Head: Normocephalic and atraumatic.   Right Ear: External ear normal.   Left Ear: External ear normal.   Nose: Nose normal.   Eyes: Conjunctivae and EOM are normal. Pupils are equal, round, and reactive to light. No scleral icterus.     Neck: Normal range of motion. Neck supple. No tracheal deviation present. No thyromegaly present.     Pulmonary/Chest: Effort normal and breath sounds normal. No respiratory distress. She has inspiratory wheezes to left lower lobe. She has no rales. She exhibits no tenderness.     Cardiovascular: Regular, rate and rhythm. No JVD.    Abdominal: Soft. Bowel sounds are normal. She exhibits no distension and no mass. No tenderness. She has no rebound and no guarding.     Breast:  Symmetrical, normal consistency without masses., No dimpling or skin changes, Normal nipples without discharge, no axillary lymphadenopathy    Genitourinary:  Pelvic exam was performed with patient supine.  External genitalia with no abnormal pigmentation, labial fusion,rash, tenderness, lesion or injury to the labia bilaterally.  Vagina is moist with no lesions, foul discharge, erythema, tenderness or bleeding. Clear discharge. No foreign body around the vagina or signs of injury.   Cervix exhibits no motion tenderness, no discharge and no friability.   Uterus is midline not deviated, not enlarged, not fixed and not tender.  Right adnexum displays no mass, no tenderness and no fullness. Left adnexum displays no mass, mild tenderness with deep palpation and no fullness.     Musculoskeletal: Normal range of motion. She exhibits no edema and no tenderness.     Lymphadenopathy: She has no cervical adenopathy.     Neurological: She is alert and oriented to person, place, and time. She exhibits normal muscle tone.     Skin: Skin is warm and dry. No rash noted. She is not diaphoretic. No erythema. No pallor.     Psychiatric: She has a normal mood and affect. Her behavior is  normal. Judgment and thought content normal.             Assessment:   Bilateral lower quadrant pain  Vaginal discharge  Due for pap      Plan:   Pelvic ultrasound  Vaginal pathogens  Pap and physical exam performed  Monthly SBE.  Counseling: breast self exam.  Continue condom use.   Encourage exercise and proper diet.  Mammograms starting @ age 40 annually.  See medications and orders placed in encounter report.

## 2021-01-11 DIAGNOSIS — Z01.419 WELL WOMAN EXAM WITH ROUTINE GYNECOLOGICAL EXAM: ICD-10-CM

## 2021-01-12 DIAGNOSIS — Z01.419 WELL WOMAN EXAM WITH ROUTINE GYNECOLOGICAL EXAM: ICD-10-CM

## 2021-01-14 ENCOUNTER — TELEPHONE (OUTPATIENT)
Dept: OBGYN | Facility: CLINIC | Age: 31
End: 2021-01-14

## 2021-01-14 NOTE — TELEPHONE ENCOUNTER
----- Message from Anastasia Leung C.N.M. sent at 1/12/2021 10:18 AM PST -----  Please call patient with abnormal BV results, will send RX to pharmacy. Pap smear was normal    Called pt and informed her test came back positive for BV. Pt informed she needs to start antibiotics. Should take the full Tx and advised to take meds with food and to avoid alcohol while on Tx and 14 days after. Pharmacy verified with pt. Pt agreed and verbalized understanding and stated she saw [employer] message and is going to  Rx today.

## 2021-01-15 ENCOUNTER — TELEPHONE (OUTPATIENT)
Dept: OBGYN | Facility: CLINIC | Age: 31
End: 2021-01-15

## 2021-01-15 RX ORDER — METRONIDAZOLE 500 MG/1
500 TABLET ORAL EVERY 12 HOURS
Qty: 14 TAB | Refills: 0 | Status: SHIPPED | OUTPATIENT
Start: 2021-01-15 | End: 2021-01-22

## 2021-01-15 NOTE — TELEPHONE ENCOUNTER
Pt called stating she went to  her Rx for Tx on BV but pharmacy said they didn't receive anything for her.  Looking into her med list past/current I don't see that an Rx has been sent to pt.

## 2021-01-15 NOTE — TELEPHONE ENCOUNTER
Consulted with Elaine Ramirez CNM and agreed to sent Rx to pt's pharmacy     Called pt back and left a message informing her Rx is been sent out now to her pharmacy [facility] on [facility]. And apologized for the inconvenience.   To call us back if any questions.

## 2021-05-18 ENCOUNTER — TELEMEDICINE (OUTPATIENT)
Dept: MEDICAL GROUP | Facility: CLINIC | Age: 31
End: 2021-05-18
Payer: MEDICAID

## 2021-05-18 VITALS — HEIGHT: 68 IN | BODY MASS INDEX: 20.46 KG/M2 | WEIGHT: 135 LBS

## 2021-05-18 DIAGNOSIS — Z01.818 ENCOUNTER FOR PREADMISSION TESTING: ICD-10-CM

## 2021-05-18 DIAGNOSIS — F11.90 NARCOTIC DRUG USE: ICD-10-CM

## 2021-05-18 PROCEDURE — 99202 OFFICE O/P NEW SF 15 MIN: CPT | Performed by: PHYSICIAN ASSISTANT

## 2021-05-18 ASSESSMENT — PATIENT HEALTH QUESTIONNAIRE - PHQ9: CLINICAL INTERPRETATION OF PHQ2 SCORE: 0

## 2021-05-18 NOTE — PROGRESS NOTES
Virtual Visit: New Patient   This visit was conducted via Zoom using secure and encrypted videoconferencing technology. The patient was in a private location in the state of [place].    The patient's identity was confirmed and verbal consent was obtained for this virtual visit.    Subjective:     CC:   Chief Complaint   Patient presents with   • [facility]     referral for ekg       Alicia Peters is a 30 y.o. female presenting to establish care and to discuss the evaluation and management of:    Narcotic drug use  Chronic condition for the last 6 years. Trying to get into a inpatient drug rehabilitation program currently and needs EKG for entry. Has been buying her drugs on the street, not being prescribed. EKG ordered.      ROS  See HPI  Constitutional: Negative for fever, chills and malaise/fatigue.   HENT: Negative for congestion.    Eyes: Negative for pain.   Respiratory: Negative for cough and shortness of breath.    Cardiovascular: Negative for leg swelling.   Gastrointestinal: Negative for nausea, vomiting, abdominal pain and diarrhea.   Genitourinary: Negative for dysuria and hematuria.   Skin: Negative for rash.   Neurological: Negative for dizziness, focal weakness and headaches.   Endo/Heme/Allergies: Does not bruise/bleed easily.   Psychiatric/Behavioral: Negative for depression.  The patient is not nervous/anxious.      No Known Allergies    Current medicines (including changes today)  No current outpatient medications on file.     No current facility-administered medications for this visit.       She  has a past medical history of Depression and Substance abuse ([facility]). She also has no past medical history of Addisons disease ([facility]), Adrenal disorder ([facility]), Allergy, Anemia, Anxiety, Arrhythmia, Arthritis, Asthma, Blood transfusion without reported diagnosis, Cancer ([facility]), CHF (congestive heart failure) ([facility]), Clotting disorder ([facility]), COPD (chronic obstructive pulmonary disease) ([facility]), Cushings syndrome  "(HCC), Diabetes (HCC), Diabetic neuropathy (HCC), GERD (gastroesophageal reflux disease), Glaucoma, Goiter, Head ache, Heart attack (HCC), Heart murmur, HIV (human immunodeficiency virus infection) (HCC), Hyperlipidemia, Hypertension, IBD (inflammatory bowel disease), Kidney disease, Meningitis, Migraine, Muscle disorder, Osteoporosis, Parathyroid disorder (HCC), Pituitary disease (HCC), Pulmonary emphysema (HCC), Seizure (HCC), Sickle cell disease (HCC), Stroke (HCC), Thyroid disease, Tuberculosis, or Urinary tract infection.  She  has a past surgical history that includes gyn surgery (2017) and abdominal exploration (Left, 2017).      Family History   Problem Relation Age of Onset   • Psychiatric Illness Mother         Anxiety and depression   • Drug abuse Mother    • Alcohol abuse Mother    • Alcohol abuse Father    • Drug abuse Father    • No Known Problems Sister    • No Known Problems Brother    • Alcohol abuse Maternal Grandmother    • Alzheimer's Disease Maternal Grandfather    • No Known Problems Brother    • No Known Problems Brother    • No Known Problems Half-brother      Family Status   Relation Name Status   • Mo  Alive   • Fa  Alive   • Sis 1 Alive   • Bro 4 Alive       Patient Active Problem List    Diagnosis Date Noted   • Narcotic drug use 05/18/2021   • Encounter for preadmission testing 05/18/2021          Objective:   Ht 1.727 m (5' 8\")   Wt 61.2 kg (135 lb)   BMI 20.53 kg/m²     Physical Exam:  Constitutional: Alert, no distress, well-groomed.  Skin: No rashes in visible areas.  Eye: Round. Conjunctiva clear, lids normal. No icterus.   ENMT: Lips pink without lesions, good dentition, moist mucous membranes. Phonation normal.  Neck: No masses, no thyromegaly. Moves freely without pain.  Respiratory: Unlabored respiratory effort, no cough or audible wheeze  Psych: Alert and oriented x3, normal affect and mood.       Assessment and Plan:   The following treatment plan was discussed:     1. " Narcotic drug use  - EKG - Cardiology Performed; Future    2. Encounter for preadmission testing  - EKG - Cardiology Performed; Future        Follow-up: Return in about 6 months (around 11/18/2021) for after rehab.

## 2021-05-18 NOTE — ASSESSMENT & PLAN NOTE
Chronic condition for the last 6 years. Trying to get into a inpatient drug rehabilitation program currently and needs EKG for entry. Has been buying her drugs on the street, not being prescribed. EKG ordered.

## 2021-05-20 ENCOUNTER — HOSPITAL ENCOUNTER (OUTPATIENT)
Dept: CARDIOLOGY | Facility: MEDICAL CENTER | Age: 31
End: 2021-05-20
Attending: PHYSICIAN ASSISTANT
Payer: MEDICAID

## 2021-05-20 DIAGNOSIS — R94.31 NONSPECIFIC ABNORMAL ELECTROCARDIOGRAM (ECG) (EKG): ICD-10-CM

## 2021-05-20 LAB — EKG IMPRESSION: NORMAL

## 2021-05-20 PROCEDURE — 93005 ELECTROCARDIOGRAM TRACING: CPT | Performed by: PHYSICIAN ASSISTANT

## 2021-05-20 PROCEDURE — 93010 ELECTROCARDIOGRAM REPORT: CPT | Performed by: INTERNAL MEDICINE

## 2021-05-21 ENCOUNTER — TELEPHONE (OUTPATIENT)
Dept: HEALTH INFORMATION MANAGEMENT | Facility: OTHER | Age: 31
End: 2021-05-21

## 2021-05-21 DIAGNOSIS — R94.31 ABNORMAL EKG: ICD-10-CM

## 2021-05-21 NOTE — TELEPHONE ENCOUNTER
1. Caller Name: Alicia                        Call Back Number: [phone]      How would the patient prefer to be contacted with a response: MyChart message    2. SPECIFIC Action To Be Taken: Referral pending, please sign.    3. Diagnosis/Clinical Reason for Request:   EKG requested by rehabilitation center and needed in order to be admitted.    4. Specialty & Provider Name/Lab/Imaging Location: [facility]    5. Is appointment scheduled for requested order/referral: no    Patient was not informed they will receive a return phone call from the office ONLY if there are any questions before processing their request. Advised to call back if they haven't received a call from the referral department in 5 days.

## 2023-09-18 ENCOUNTER — DOCUMENTATION (OUTPATIENT)
Dept: HEALTH INFORMATION MANAGEMENT | Facility: OTHER | Age: 33
End: 2023-09-18
Payer: MEDICAID

## 2024-01-31 ENCOUNTER — TELEPHONE (OUTPATIENT)
Dept: HEALTH INFORMATION MANAGEMENT | Facility: OTHER | Age: 34
End: 2024-01-31
Payer: MEDICAID

## 2025-07-15 ENCOUNTER — HOSPITAL ENCOUNTER (OUTPATIENT)
Facility: MEDICAL CENTER | Age: 35
End: 2025-07-15
Payer: MEDICAID

## 2025-07-15 PROCEDURE — 81513 NFCT DS BV RNA VAG FLU ALG: CPT

## 2025-07-15 PROCEDURE — 87491 CHLMYD TRACH DNA AMP PROBE: CPT

## 2025-07-15 PROCEDURE — 87591 N.GONORRHOEAE DNA AMP PROB: CPT

## 2025-07-15 PROCEDURE — 87481 CANDIDA DNA AMP PROBE: CPT | Mod: 91

## 2025-07-15 PROCEDURE — 87661 TRICHOMONAS VAGINALIS AMPLIF: CPT

## 2025-07-18 LAB
BV BACTERIA DNA VAG QL NAA+PROBE: NEGATIVE
C GLABRATA DNA VAG QL NAA+PROBE: NEGATIVE
C TRACH DNA SPEC QL NAA+PROBE: NEGATIVE
CANDIDA DNA VAG QL NAA+PROBE: NEGATIVE
N GONORRHOEA DNA SPEC QL NAA+PROBE: NEGATIVE
SPEC CONTAINER SPEC: NORMAL
SPECIMEN SOURCE: NORMAL
T VAGINALIS DNA VAG QL NAA+PROBE: NEGATIVE